# Patient Record
Sex: FEMALE | Race: BLACK OR AFRICAN AMERICAN | NOT HISPANIC OR LATINO | Employment: PART TIME | URBAN - METROPOLITAN AREA
[De-identification: names, ages, dates, MRNs, and addresses within clinical notes are randomized per-mention and may not be internally consistent; named-entity substitution may affect disease eponyms.]

---

## 2020-08-18 ENCOUNTER — HOSPITAL ENCOUNTER (EMERGENCY)
Facility: HOSPITAL | Age: 30
Discharge: HOME | End: 2020-08-18
Attending: EMERGENCY MEDICINE | Admitting: EMERGENCY MEDICINE
Payer: COMMERCIAL

## 2020-08-18 VITALS
HEART RATE: 80 BPM | BODY MASS INDEX: 34.07 KG/M2 | SYSTOLIC BLOOD PRESSURE: 131 MMHG | TEMPERATURE: 96.7 F | RESPIRATION RATE: 16 BRPM | OXYGEN SATURATION: 100 % | HEIGHT: 69 IN | DIASTOLIC BLOOD PRESSURE: 76 MMHG | WEIGHT: 230 LBS

## 2020-08-18 DIAGNOSIS — M54.30 SCIATICA: Primary | ICD-10-CM

## 2020-08-18 PROCEDURE — 99284 EMERGENCY DEPT VISIT MOD MDM: CPT | Performed by: EMERGENCY MEDICINE

## 2020-08-18 PROCEDURE — 99283 EMERGENCY DEPT VISIT LOW MDM: CPT

## 2020-08-18 RX ORDER — ALBUTEROL SULFATE 2.5 MG/3ML
2.5 SOLUTION RESPIRATORY (INHALATION) EVERY 6 HOURS PRN
COMMUNITY
Start: 2020-03-17

## 2020-08-18 RX ORDER — MONTELUKAST SODIUM 10 MG/1
10 TABLET ORAL
COMMUNITY
Start: 2020-04-16

## 2020-08-18 RX ORDER — PREDNISONE 20 MG/1
40 TABLET ORAL DAILY
Qty: 10 TABLET | Refills: 0 | Status: SHIPPED | OUTPATIENT
Start: 2020-08-18 | End: 2020-08-23

## 2020-08-18 NOTE — ED PROVIDER NOTES
History  Chief Complaint   Patient presents with    Leg Pain     Patient reports her right leg hurts from thigh down to ankle from laying on the ground 3 weeks ago  Reports using biofreeze  This is a 62-year-old female who presents emergency department with right buttock pain and radiation down the right leg  This began approximately 3 weeks ago when she slipped on the floor  She states that since that time it has been present  Increasing recently  No relief with Aleve or Biofreeze  No weakness or numbness  No loss of bowel or bladder function  Pain is moderate severity  Worse with movement  Radiates down the back and the lateral aspect of the right leg to the knee  No other associated factors  No other history of trauma  Prior to Admission Medications   Prescriptions Last Dose Informant Patient Reported? Taking? albuterol (2 5 mg/3 mL) 0 083 % nebulizer solution   Yes Yes   Sig: Inhale 2 5 mg every 6 (six) hours as needed    metFORMIN (GLUCOPHAGE) 1000 MG tablet   Yes No   Sig: Take 1,000 mg by mouth daily    montelukast (SINGULAIR) 10 mg tablet   Yes Yes   Sig: Take 10 mg by mouth daily at bedtime       Facility-Administered Medications: None       Past Medical History:   Diagnosis Date    Asthma     PCOS (polycystic ovarian syndrome)        History reviewed  No pertinent surgical history  History reviewed  No pertinent family history  I have reviewed and agree with the history as documented  E-Cigarette/Vaping     E-Cigarette/Vaping Substances     Social History     Tobacco Use    Smoking status: Current Every Day Smoker     Packs/day: 0 25     Types: Cigarettes    Smokeless tobacco: Never Used   Substance Use Topics    Alcohol use: Yes     Frequency: Monthly or less    Drug use: Yes     Frequency: 14 0 times per week     Types: Marijuana       Review of Systems   Constitutional: Negative for activity change, chills and fever     Respiratory: Negative for cough and shortness of breath  Cardiovascular: Negative for chest pain and palpitations  Gastrointestinal: Negative for abdominal pain, diarrhea, nausea and vomiting  Endocrine: Negative for polyuria  Genitourinary: Negative for difficulty urinating, dysuria, frequency and urgency  Musculoskeletal: Positive for back pain  Negative for arthralgias, gait problem, myalgias and neck stiffness  Skin: Negative for color change and rash  Allergic/Immunologic: Negative for immunocompromised state  Neurological: Negative for dizziness, syncope, weakness and numbness  Hematological: Does not bruise/bleed easily  Psychiatric/Behavioral: Negative for confusion  Physical Exam  Physical Exam  Vitals signs and nursing note reviewed  Constitutional:       General: She is not in acute distress  Appearance: She is well-developed  HENT:      Head: Normocephalic  Nose: Nose normal    Eyes:      General: No scleral icterus  Conjunctiva/sclera: Conjunctivae normal    Neck:      Musculoskeletal: Normal range of motion and neck supple  Cardiovascular:      Rate and Rhythm: Normal rate and regular rhythm  Heart sounds: Normal heart sounds  No murmur  Pulmonary:      Effort: Pulmonary effort is normal  No respiratory distress  Breath sounds: Normal breath sounds  No wheezing  Abdominal:      General: There is no distension  Palpations: Abdomen is soft  Tenderness: There is no abdominal tenderness  Musculoskeletal:         General: Tenderness (Tender over the right buttock ) present  No deformity  Comments: 5/5 strength bilateral lower extremities  Normal sensation bilateral lower extremities  Reproducible symptoms with right straight leg raise  No midline spinal tenderness  Skin:     General: Skin is warm and dry  Findings: No erythema or rash  Neurological:      General: No focal deficit present        Mental Status: She is alert and oriented to person, place, and time       Motor: No abnormal muscle tone  Coordination: Coordination normal    Psychiatric:         Thought Content: Thought content normal          Vital Signs  ED Triage Vitals [08/18/20 0701]   Temperature Pulse Respirations Blood Pressure SpO2   (!) 96 7 °F (35 9 °C) 80 16 131/76 100 %      Temp Source Heart Rate Source Patient Position - Orthostatic VS BP Location FiO2 (%)   Tympanic Monitor Lying Right arm --      Pain Score       9           Vitals:    08/18/20 0701   BP: 131/76   Pulse: 80   Patient Position - Orthostatic VS: Lying         Visual Acuity      ED Medications  Medications - No data to display    Diagnostic Studies  Results Reviewed     None                 No orders to display              Procedures  Procedures         ED Course       US AUDIT      Most Recent Value   Initial Alcohol Screen: US AUDIT-C    1  How often do you have a drink containing alcohol? 2 Filed at: 08/18/2020 0714   2  How many drinks containing alcohol do you have on a typical day you are drinking? 0 Filed at: 08/18/2020 0714   3b  FEMALE Any Age, or MALE 65+: How often do you have 4 or more drinks on one occassion? 0 Filed at: 08/18/2020 0714   Audit-C Score  2 Filed at: 08/18/2020 7812                                            [facility]  Number of Diagnoses or Management Options  Diagnosis management comments: Patient with history consistent with sciatica  No history of fevers  No neuro deficit  Will try conservative management follow-up PCP if not better in 1 week  Disposition  Final diagnoses:   Sciatica     Time reflects when diagnosis was documented in both MDM as applicable and the Disposition within this note     Time User Action Codes Description Comment    8/18/2020  7:24 AM Zack Tsang Add [M54 30] Sciatica       ED Disposition     ED Disposition Condition Date/Time Comment    Discharge Stable Tue Aug 18, 2020  7:24 AM Paddy Vuong discharge to home/self care              Follow-up Information     Follow up With Specialties Details Why Contact Info    Your primary care provider  In 1 week If not improved           Patient's Medications   Discharge Prescriptions    PREDNISONE 20 MG TABLET    Take 2 tablets (40 mg total) by mouth daily for 5 days       Start Date: 8/18/2020 End Date: 8/23/2020       Order Dose: 40 mg       Quantity: 10 tablet    Refills: 0     No discharge procedures on file      PDMP Review     None          ED Provider  Electronically Signed by           Marisa Nelson MD  08/18/20 8017

## 2020-09-23 ENCOUNTER — HOSPITAL ENCOUNTER (EMERGENCY)
Facility: HOSPITAL | Age: 30
Discharge: HOME | End: 2020-09-23
Attending: EMERGENCY MEDICINE
Payer: COMMERCIAL

## 2020-09-23 VITALS
DIASTOLIC BLOOD PRESSURE: 102 MMHG | HEART RATE: 91 BPM | OXYGEN SATURATION: 100 % | SYSTOLIC BLOOD PRESSURE: 147 MMHG | BODY MASS INDEX: 34.46 KG/M2 | TEMPERATURE: 98.2 F | RESPIRATION RATE: 18 BRPM | WEIGHT: 230 LBS

## 2020-09-23 DIAGNOSIS — M54.9 BACK PAIN: Primary | ICD-10-CM

## 2020-09-23 LAB
EXT PREG TEST URINE: NEGATIVE
EXT. CONTROL ED NAV: NORMAL

## 2020-09-23 PROCEDURE — 99284 EMERGENCY DEPT VISIT MOD MDM: CPT | Performed by: EMERGENCY MEDICINE

## 2020-09-23 PROCEDURE — 96372 THER/PROPH/DIAG INJ SC/IM: CPT

## 2020-09-23 PROCEDURE — 81025 URINE PREGNANCY TEST: CPT | Performed by: EMERGENCY MEDICINE

## 2020-09-23 PROCEDURE — 99283 EMERGENCY DEPT VISIT LOW MDM: CPT

## 2020-09-23 RX ORDER — PREDNISONE 20 MG/1
40 TABLET ORAL DAILY
Qty: 8 TABLET | Refills: 0 | Status: SHIPPED | OUTPATIENT
Start: 2020-09-23 | End: 2020-09-27

## 2020-09-23 RX ORDER — KETOROLAC TROMETHAMINE 30 MG/ML
15 INJECTION, SOLUTION INTRAMUSCULAR; INTRAVENOUS ONCE
Status: COMPLETED | OUTPATIENT
Start: 2020-09-23 | End: 2020-09-23

## 2020-09-23 RX ORDER — ACETAMINOPHEN 325 MG/1
650 TABLET ORAL EVERY 6 HOURS PRN
Qty: 30 TABLET | Refills: 0 | Status: SHIPPED | OUTPATIENT
Start: 2020-09-23 | End: 2020-09-28

## 2020-09-23 RX ORDER — IBUPROFEN 400 MG/1
400 TABLET ORAL EVERY 6 HOURS PRN
Qty: 30 TABLET | Refills: 0 | Status: SHIPPED | OUTPATIENT
Start: 2020-09-23 | End: 2021-06-01 | Stop reason: ALTCHOICE

## 2020-09-23 RX ORDER — ACETAMINOPHEN 325 MG/1
650 TABLET ORAL ONCE
Status: COMPLETED | OUTPATIENT
Start: 2020-09-23 | End: 2020-09-23

## 2020-09-23 RX ORDER — CYCLOBENZAPRINE HCL 5 MG
5 TABLET ORAL 2 TIMES DAILY PRN
Qty: 10 TABLET | Refills: 0 | Status: SHIPPED | OUTPATIENT
Start: 2020-09-23 | End: 2020-10-05 | Stop reason: ALTCHOICE

## 2020-09-23 RX ADMIN — PREDNISONE 50 MG: 20 TABLET ORAL at 18:22

## 2020-09-23 RX ADMIN — KETOROLAC TROMETHAMINE 15 MG: 30 INJECTION, SOLUTION INTRAMUSCULAR at 18:20

## 2020-09-23 RX ADMIN — ACETAMINOPHEN 650 MG: 325 TABLET, FILM COATED ORAL at 18:22

## 2020-09-23 RX ADMIN — DICLOFENAC SODIUM 2 G: 10 GEL TOPICAL at 18:24

## 2020-09-26 NOTE — ED PROVIDER NOTES
History  Chief Complaint   Patient presents with    Leg Pain     Pt reports she was seen here last month for the same right leg pain, worsening progressively      HPI    Patient is a 74-year-old female who reports to the emergency department with lower back pain  She notes the pain starts in the gluteal area and radiates down the leg  Denies fevers, chills, sweats  No saddle anesthesia  Full strength and sensation bilateral lower extremities  No loss of bowel or bladder function  Denies IV drug use  Denies history of cancer  Denies direct trauma  No unexpected weight loss  No long term steroid use  No pulsatile abdominal mass  No hematuria  No HIV, Transplant or systemic corticosteroids  No midline tenderness  She notes being seen previously and diagnosed with sciatic pain  This is consistent with my exam     Pain is achy, occasionally electrical in nature, shoots down the leg, initially got better after being seen in the emergency department last month but has since worsened over the past several days  Medical decision makin-year-old female, suspect sciatic pain, will treat symptomatically, discharge home, follow-up with orthopedics spine specialist     Prior to Admission Medications   Prescriptions Last Dose Informant Patient Reported? Taking? albuterol (2 5 mg/3 mL) 0 083 % nebulizer solution   Yes No   Sig: Inhale 2 5 mg every 6 (six) hours as needed    metFORMIN (GLUCOPHAGE) 1000 MG tablet   Yes No   Sig: Take 1,000 mg by mouth daily    montelukast (SINGULAIR) 10 mg tablet 2020 at Unknown time  Yes Yes   Sig: Take 10 mg by mouth daily at bedtime       Facility-Administered Medications: None       Past Medical History:   Diagnosis Date    Asthma     PCOS (polycystic ovarian syndrome)        History reviewed  No pertinent surgical history  History reviewed  No pertinent family history  I have reviewed and agree with the history as documented      E-Cigarette/Vaping E-Cigarette/Vaping Substances     Social History     Tobacco Use    Smoking status: Current Every Day Smoker     Packs/day: 0 25     Types: Cigarettes    Smokeless tobacco: Never Used   Substance Use Topics    Alcohol use: Yes     Frequency: Monthly or less    Drug use: Yes     Frequency: 14 0 times per week     Types: Marijuana       Review of Systems   Constitutional: Negative for chills and fever  HENT: Negative for ear discharge and facial swelling  Respiratory: Negative for chest tightness, shortness of breath and wheezing  Cardiovascular: Negative for chest pain and palpitations  Gastrointestinal: Negative for abdominal pain, diarrhea and vomiting  Genitourinary: Negative for dysuria and hematuria  Musculoskeletal: Positive for back pain  Negative for arthralgias and neck stiffness  Skin: Negative for color change and rash  Allergic/Immunologic: Negative  Neurological: Negative for tremors, seizures and syncope  Psychiatric/Behavioral: Negative for hallucinations and suicidal ideas  All other systems reviewed and are negative  Physical Exam  Physical Exam  Vitals signs and nursing note reviewed  Constitutional:       Appearance: She is well-developed  HENT:      Head: Normocephalic and atraumatic  Right Ear: External ear normal       Left Ear: External ear normal    Eyes:      Conjunctiva/sclera: Conjunctivae normal    Neck:      Musculoskeletal: Normal range of motion and neck supple  Vascular: No JVD  Trachea: No tracheal deviation  Cardiovascular:      Rate and Rhythm: Normal rate and regular rhythm  Heart sounds: Normal heart sounds  Pulmonary:      Effort: Pulmonary effort is normal  No respiratory distress  Breath sounds: No wheezing or rales  Abdominal:      General: Bowel sounds are normal       Palpations: Abdomen is soft  Tenderness: There is no abdominal tenderness  There is no guarding or rebound     Musculoskeletal: General: No tenderness  Comments: Tender to left gluteal muscle   Skin:     General: Skin is warm and dry  Findings: No erythema or rash  Neurological:      General: No focal deficit present  Mental Status: She is alert and oriented to person, place, and time  Motor: No weakness  Psychiatric:         Behavior: Behavior normal          Thought Content: Thought content normal          Vital Signs  ED Triage Vitals [09/23/20 1755]   Temperature Pulse Respirations Blood Pressure SpO2   98 2 °F (36 8 °C) 91 18 (!) 147/102 100 %      Temp Source Heart Rate Source Patient Position - Orthostatic VS BP Location FiO2 (%)   Tympanic Monitor Sitting Left arm --      Pain Score       Worst Possible Pain           Vitals:    09/23/20 1755   BP: (!) 147/102   Pulse: 91   Patient Position - Orthostatic VS: Sitting         Visual Acuity      ED Medications  Medications   ketorolac (TORADOL) injection 15 mg (15 mg Intramuscular Given 9/23/20 1820)   acetaminophen (TYLENOL) tablet 650 mg (650 mg Oral Given 9/23/20 1822)   diclofenac sodium (VOLTAREN) 1 % topical gel 2 g (2 g Topical Given 9/23/20 1824)   predniSONE tablet 50 mg (50 mg Oral Given 9/23/20 1822)       Diagnostic Studies  Results Reviewed     Procedure Component Value Units Date/Time    POCT pregnancy, urine [[id number]]  (Normal) Resulted:  09/23/20 1917    Lab Status:  Final result Updated:  09/23/20 1917     EXT PREG TEST UR (Ref: Negative) negative     Control valid                 No orders to display              Procedures  Procedures         ED Course                           SBIRT 20yo+      Most Recent Value   SBIRT (22 yo +)   In order to provide better care to our patients, we are screening all of our patients for alcohol and drug use  Would it be okay to ask you these screening questions?   No Filed at: 09/23/2020 1757                  MDM    Disposition  Final diagnoses:   Back pain     Time reflects when diagnosis was documented in both MDM as applicable and the Disposition within this note     Time User Action Codes Description Comment    9/23/2020  7:20 PM Ajit Bahena, [address] [M54 9] Back pain       ED Disposition     ED Disposition Condition Date/Time Comment    Discharge Stable Wed Sep 23, 2020  7:13 PM Yolie Lara discharge to home/self care              Follow-up Information     Follow up With Specialties Details Why Contact Info    [facility]  Call in 3 days  [facility]  [address] [place] [address]   [phone]          Discharge Medication List as of 9/23/2020  7:20 PM      START taking these medications    Details   acetaminophen (TYLENOL) 325 mg tablet Take 2 tablets (650 mg total) by mouth every 6 (six) hours as needed for mild pain for up to 5 days, Starting Wed 9/23/2020, Until Mon 9/28/2020, Print      cyclobenzaprine (FLEXERIL) 5 mg tablet Take 1 tablet (5 mg total) by mouth 2 (two) times a day as needed for muscle spasms for up to 4 days, Starting Wed 9/23/2020, Until Sun 9/27/2020, Print      diclofenac sodium (VOLTAREN) 1 % Apply 2 g topically 4 (four) times a day for 5 days, Starting Wed 9/23/2020, Until Mon 9/28/2020, Print      ibuprofen (MOTRIN) 400 mg tablet Take 1 tablet (400 mg total) by mouth every 6 (six) hours as needed for mild pain for up to 5 days, Starting Wed 9/23/2020, Until Mon 9/28/2020, Print      predniSONE 20 mg tablet Take 2 tablets (40 mg total) by mouth daily for 4 days, Starting Wed 9/23/2020, Until Sun 9/27/2020, Print         CONTINUE these medications which have NOT CHANGED    Details   montelukast (SINGULAIR) 10 mg tablet Take 10 mg by mouth daily at bedtime , Starting Thu 4/16/2020, Historical Med      albuterol (2 5 mg/3 mL) 0 083 % nebulizer solution Inhale 2 5 mg every 6 (six) hours as needed , Starting Tue 3/17/2020, Historical Med      metFORMIN (GLUCOPHAGE) 1000 MG tablet Take 1,000 mg by mouth daily , Historical Med           No discharge procedures on file      PDMP Review     None          ED Provider  Electronically Signed by           Mona Sutton MD  09/26/20 6281

## 2020-10-02 ENCOUNTER — APPOINTMENT (OUTPATIENT)
Dept: RADIOLOGY | Facility: CLINIC | Age: 30
End: 2020-10-02
Payer: COMMERCIAL

## 2020-10-02 ENCOUNTER — OFFICE VISIT (OUTPATIENT)
Dept: OBGYN CLINIC | Facility: CLINIC | Age: 30
End: 2020-10-02
Payer: COMMERCIAL

## 2020-10-02 VITALS
BODY MASS INDEX: 34.46 KG/M2 | HEART RATE: 88 BPM | HEIGHT: 69 IN | SYSTOLIC BLOOD PRESSURE: 128 MMHG | DIASTOLIC BLOOD PRESSURE: 90 MMHG

## 2020-10-02 DIAGNOSIS — M54.16 ACUTE RIGHT LUMBAR RADICULOPATHY: Primary | ICD-10-CM

## 2020-10-02 DIAGNOSIS — M54.16 LUMBAR RADICULOPATHY: ICD-10-CM

## 2020-10-02 PROCEDURE — 99203 OFFICE O/P NEW LOW 30 MIN: CPT | Performed by: ORTHOPAEDIC SURGERY

## 2020-10-02 PROCEDURE — 72100 X-RAY EXAM L-S SPINE 2/3 VWS: CPT

## 2020-10-02 RX ORDER — NAPROXEN 500 MG/1
500 TABLET ORAL 2 TIMES DAILY WITH MEALS
Qty: 60 TABLET | Refills: 0 | Status: SHIPPED | OUTPATIENT
Start: 2020-10-02 | End: 2020-10-26

## 2020-10-05 DIAGNOSIS — M54.9 BACK PAIN: ICD-10-CM

## 2020-10-05 RX ORDER — CYCLOBENZAPRINE HCL 10 MG
10 TABLET ORAL 3 TIMES DAILY PRN
Qty: 20 TABLET | Refills: 0 | Status: SHIPPED | OUTPATIENT
Start: 2020-10-05 | End: 2020-11-16 | Stop reason: SDUPTHER

## 2020-10-24 DIAGNOSIS — M54.16 ACUTE RIGHT LUMBAR RADICULOPATHY: ICD-10-CM

## 2020-10-26 RX ORDER — NAPROXEN 500 MG/1
TABLET ORAL
Qty: 60 TABLET | Refills: 0 | Status: SHIPPED | OUTPATIENT
Start: 2020-10-26 | End: 2020-12-17 | Stop reason: ALTCHOICE

## 2020-11-16 ENCOUNTER — TELEPHONE (OUTPATIENT)
Dept: OBGYN CLINIC | Facility: HOSPITAL | Age: 30
End: 2020-11-16

## 2020-11-16 DIAGNOSIS — M54.9 BACK PAIN: ICD-10-CM

## 2020-11-16 RX ORDER — CYCLOBENZAPRINE HCL 10 MG
10 TABLET ORAL 3 TIMES DAILY PRN
Qty: 30 TABLET | Refills: 0 | Status: SHIPPED | OUTPATIENT
Start: 2020-11-16 | End: 2020-12-15 | Stop reason: SDUPTHER

## 2020-12-15 ENCOUNTER — TELEPHONE (OUTPATIENT)
Dept: OBGYN CLINIC | Facility: HOSPITAL | Age: 30
End: 2020-12-15

## 2020-12-15 DIAGNOSIS — M54.9 BACK PAIN: ICD-10-CM

## 2020-12-15 RX ORDER — CYCLOBENZAPRINE HCL 10 MG
10 TABLET ORAL 3 TIMES DAILY PRN
Qty: 30 TABLET | Refills: 0 | Status: SHIPPED | OUTPATIENT
Start: 2020-12-15 | End: 2021-01-15 | Stop reason: SDUPTHER

## 2020-12-17 ENCOUNTER — TELEMEDICINE (OUTPATIENT)
Dept: OBGYN CLINIC | Facility: CLINIC | Age: 30
End: 2020-12-17
Payer: COMMERCIAL

## 2020-12-17 DIAGNOSIS — M54.16 ACUTE RIGHT LUMBAR RADICULOPATHY: Primary | ICD-10-CM

## 2020-12-17 PROCEDURE — 99214 OFFICE O/P EST MOD 30 MIN: CPT | Performed by: ORTHOPAEDIC SURGERY

## 2020-12-17 RX ORDER — DICLOFENAC SODIUM 75 MG/1
75 TABLET, DELAYED RELEASE ORAL 2 TIMES DAILY
Qty: 60 TABLET | Refills: 0 | Status: SHIPPED | OUTPATIENT
Start: 2020-12-17 | End: 2021-01-15 | Stop reason: ALTCHOICE

## 2021-01-04 ENCOUNTER — TELEPHONE (OUTPATIENT)
Dept: OBGYN CLINIC | Facility: HOSPITAL | Age: 31
End: 2021-01-04

## 2021-01-04 DIAGNOSIS — M54.16 ACUTE RIGHT LUMBAR RADICULOPATHY: Primary | ICD-10-CM

## 2021-01-04 NOTE — TELEPHONE ENCOUNTER
Patient is calling in reference to authorization for an MRI of her back  Please call the patient to advise if authorization has been received or not  Order should have been placed on 12/17/20      Please advise

## 2021-01-06 NOTE — TELEPHONE ENCOUNTER
I have contacted [facility] - spoke with Cay Eisenmenger  Updated site to facility listed  Contacted Yolie to let her know

## 2021-01-06 NOTE — TELEPHONE ENCOUNTER
Patient states Auth is needed for MRI ordered by Dr Safia Stewart [address] [facility]  [facility] [address] [facility] [address] [facility] [id number]     Phone#  [phone]     Fax#  [phone]

## 2021-01-09 ENCOUNTER — TELEPHONE (OUTPATIENT)
Dept: OBGYN CLINIC | Facility: HOSPITAL | Age: 31
End: 2021-01-09

## 2021-01-09 NOTE — TELEPHONE ENCOUNTER
Patient sees Dr Jimenez Longoria   Patient is calling in wanting to get the MRI script faxed over to [facility] at [phone] please fax patient is currently at her appointment now

## 2021-01-15 ENCOUNTER — OFFICE VISIT (OUTPATIENT)
Dept: OBGYN CLINIC | Facility: CLINIC | Age: 31
End: 2021-01-15
Payer: COMMERCIAL

## 2021-01-15 VITALS
HEART RATE: 100 BPM | WEIGHT: 244 LBS | HEIGHT: 69 IN | DIASTOLIC BLOOD PRESSURE: 90 MMHG | SYSTOLIC BLOOD PRESSURE: 141 MMHG | BODY MASS INDEX: 36.14 KG/M2

## 2021-01-15 DIAGNOSIS — M54.9 BACK PAIN: ICD-10-CM

## 2021-01-15 DIAGNOSIS — M51.16 LUMBAR DISC HERNIATION WITH RADICULOPATHY: Primary | ICD-10-CM

## 2021-01-15 PROCEDURE — 99213 OFFICE O/P EST LOW 20 MIN: CPT | Performed by: ORTHOPAEDIC SURGERY

## 2021-01-15 RX ORDER — MELOXICAM 15 MG/1
15 TABLET ORAL DAILY
Qty: 30 TABLET | Refills: 0 | Status: SHIPPED | OUTPATIENT
Start: 2021-01-15 | End: 2021-06-01

## 2021-01-15 RX ORDER — CYCLOBENZAPRINE HCL 10 MG
10 TABLET ORAL 3 TIMES DAILY PRN
Qty: 30 TABLET | Refills: 0 | Status: SHIPPED | OUTPATIENT
Start: 2021-01-15 | End: 2021-02-08

## 2021-01-15 RX ORDER — CYCLOBENZAPRINE HCL 10 MG
10 TABLET ORAL 3 TIMES DAILY PRN
Qty: 30 TABLET | Refills: 0 | Status: CANCELLED | OUTPATIENT
Start: 2021-01-15

## 2021-01-15 NOTE — PROGRESS NOTES
Assessment/Plan:  1  Lumbar disc herniation with radiculopathy  Ambulatory referral to Pain Management    meloxicam (MOBIC) 15 mg tablet       Yolie has low back pain and a lumbar disc herniation on her MRI report  She did not bring the disc for review today  I referred her to Dr Igor Mccauley pain management to discuss potential injection at this level  She was advised to bring the MRI report at that time  Subjective:   Tosin Brewster is a 31 y o  female who presents who presents to the office for follow-up evaluation on her lumbar spine  She has had a long history of low back pain and lumbar radiculopathy down the right leg  She failed conservative treatment of physical therapy  We sent her for an MRI of the lumbar spine she returns for results today  She has had no significant relief with anti-inflammatories, therapy, prednisone or muscle relaxers  She denies significant weakness or numbness in her lower extremity  Review of Systems   Constitutional: Negative for chills, fever and unexpected weight change  HENT: Negative for hearing loss, nosebleeds and sore throat  Eyes: Negative for pain, redness and visual disturbance  Respiratory: Negative for cough, shortness of breath and wheezing  Cardiovascular: Negative for chest pain, palpitations and leg swelling  Gastrointestinal: Negative for abdominal pain, nausea and vomiting  Endocrine: Negative for polydipsia and polyuria  Genitourinary: Negative for dysuria and hematuria  Musculoskeletal:        See HPI   Skin: Negative for rash and wound  Neurological: Negative for dizziness, numbness and headaches  Psychiatric/Behavioral: Negative for decreased concentration and suicidal ideas  The patient is not nervous/anxious  Past Medical History:   Diagnosis Date    Asthma     PCOS (polycystic ovarian syndrome)        History reviewed  No pertinent surgical history  History reviewed  No pertinent family history      Social History     Occupational History    Not on file   Tobacco Use    Smoking status: Current Every Day Smoker     Packs/day: 0 25     Types: Cigarettes    Smokeless tobacco: Never Used   Substance and Sexual Activity    Alcohol use: Yes     Frequency: Monthly or less    Drug use: Yes     Frequency: 14 0 times per week     Types: Marijuana    Sexual activity: Not on file         Current Outpatient Medications:     albuterol (2 5 mg/3 mL) 0 083 % nebulizer solution, Inhale 2 5 mg every 6 (six) hours as needed , Disp: , Rfl:     cyclobenzaprine (FLEXERIL) 10 mg tablet, Take 1 tablet (10 mg total) by mouth 3 (three) times a day as needed for muscle spasms, Disp: 30 tablet, Rfl: 0    metFORMIN (GLUCOPHAGE) 1000 MG tablet, Take 1,000 mg by mouth daily , Disp: , Rfl:     montelukast (SINGULAIR) 10 mg tablet, Take 10 mg by mouth daily at bedtime , Disp: , Rfl:     ibuprofen (MOTRIN) 400 mg tablet, Take 1 tablet (400 mg total) by mouth every 6 (six) hours as needed for mild pain for up to 5 days, Disp: 30 tablet, Rfl: 0    meloxicam (MOBIC) 15 mg tablet, Take 1 tablet (15 mg total) by mouth daily, Disp: 30 tablet, Rfl: 0    Allergies   Allergen Reactions    Avocado Hives       Objective:  Vitals:    01/15/21 0831   BP: 141/90   Pulse: 100       Back Exam     Tenderness   The patient is experiencing tenderness in the lumbar  Muscle Strength   Right Quadriceps:  5/5   Left Quadriceps:  5/5   Right Hamstrings:  5/5   Left Hamstrings:  5/5     Tests   Straight leg raise right: positive at 90 deg  Straight leg raise left: negative    Other   Sensation: normal  Gait: normal             Physical Exam  Vitals signs reviewed  Constitutional:       Appearance: She is well-developed  HENT:      Head: Normocephalic and atraumatic  Eyes:      Conjunctiva/sclera: Conjunctivae normal       Pupils: Pupils are equal, round, and reactive to light  Neck:      Musculoskeletal: Normal range of motion and neck supple  Cardiovascular:      Rate and Rhythm: Normal rate  Pulmonary:      Effort: Pulmonary effort is normal  No respiratory distress  Skin:     General: Skin is warm and dry  Neurological:      Mental Status: She is alert and oriented to person, place, and time     Psychiatric:         Behavior: Behavior normal

## 2021-01-15 NOTE — LETTER
January 15, 2021     Patient: Shaheen Hendrix   YOB: 1990   Date of Visit: 1/15/2021       To Whom it May Concern:    Rita Guido is under my professional care  She was seen in my office on 1/15/2021  She may return to work on 1/18/2021  If you have any questions or concerns, please don't hesitate to call  Sincerely,          Zulema Damian DO        CC: Yoile Royalr

## 2021-02-03 ENCOUNTER — TELEPHONE (OUTPATIENT)
Dept: OBGYN CLINIC | Facility: HOSPITAL | Age: 31
End: 2021-02-03

## 2021-02-03 NOTE — TELEPHONE ENCOUNTER
Patient calling back stating that she was disconnected when speaking to the nurse  Patient would not give her name or  & said to just transfer her over because she didn't have time for this shit  I tried to transfer her shortly after but she hung up

## 2021-02-03 NOTE — TELEPHONE ENCOUNTER
Patient is calling back stating "oh you again"  I did let her know that our nurse does have other patients & she stated "Baby, I know but if you would have just transferred me the first time I wouldn't be calling back again   just put her on the damn phone "

## 2021-02-03 NOTE — TELEPHONE ENCOUNTER
Yolie would like a referral to a provider who is closer    Please refer to [address] [facility], [place] , [address]    [address]    Please fax to [phone], Attention Mary Portillo

## 2021-02-03 NOTE — TELEPHONE ENCOUNTER
Patient is calling because the motrin she was given is not working  Patient is stating that her nephew got a bottle of percocet for a tooth & she cannot get anything for her pain  Patient is also mad that she cannot get in with "the other mk" for another month but it is actually on 2/18/21  Patient would like a call back explaining what she can do for her pain or is asking for a pain med             [phone]

## 2021-02-03 NOTE — TELEPHONE ENCOUNTER
Spoke to patient  She stated she is taking "way too much tylenol and ibuprofen " Stated she is taking 600 mg ibuprofen Q6h and 4612-6887 mg tylenol as well Q6h and nothing is helping her pain  Stated she is not taking mobic any longer  She stated she has been using ice and heat nothing is helping  Stated she needs something for pain or a referral to a pain management doctor  Nurse advised patient that she is referred to a pain management doctor for her appointment on 2/18  Advised that she should maintain limited recommended on the bottle for OTC medications as to avoid organ damage and not take mobic in addition to the ibuprofen  Patient asked what she is supposed to do again  Stated her Cousin got percocet for a toothache and she cant get anything to help her pain from our office  Advised that we do not prescribe narcotic medications  Advised her that [facility] does not prescribe pain medications on the first visit either  She stated our pain management doctor is too far for her to see and in the middle of nurse advising that we can send pain management referral wherever she wishes she lost connection on the phone  LM on  for call back  Patient called back  Advised her that we can send the referral wherever she wishes and patient stated she wishes to end her care with our office because we are not helping her   She states, "I don't have time for this "

## 2021-02-04 DIAGNOSIS — M51.16 LUMBAR DISC HERNIATION WITH RADICULOPATHY: Primary | ICD-10-CM

## 2021-02-08 DIAGNOSIS — M54.9 BACK PAIN: ICD-10-CM

## 2021-02-08 RX ORDER — CYCLOBENZAPRINE HCL 10 MG
TABLET ORAL
Qty: 30 TABLET | Refills: 0 | Status: SHIPPED | OUTPATIENT
Start: 2021-02-08

## 2021-02-17 ENCOUNTER — TELEPHONE (OUTPATIENT)
Dept: PAIN MEDICINE | Facility: CLINIC | Age: 31
End: 2021-02-17

## 2021-02-17 NOTE — TELEPHONE ENCOUNTER
Pt has an appt tomorrow at 8:30am  Pt says she lives over 1 hr away  Pt asks if she will be rescheduled tomorrow, please give her plenty of notice ahead of time  Thanks   Ph# [phone]

## 2021-03-30 DIAGNOSIS — Z23 ENCOUNTER FOR IMMUNIZATION: ICD-10-CM

## 2021-06-01 DIAGNOSIS — M51.16 LUMBAR DISC HERNIATION WITH RADICULOPATHY: ICD-10-CM

## 2021-06-01 RX ORDER — MELOXICAM 15 MG/1
TABLET ORAL
Qty: 30 TABLET | Refills: 0 | Status: SHIPPED | OUTPATIENT
Start: 2021-06-01

## 2021-08-13 ENCOUNTER — TELEPHONE (OUTPATIENT)
Dept: OBGYN CLINIC | Facility: OTHER | Age: 31
End: 2021-08-13

## 2021-08-13 NOTE — TELEPHONE ENCOUNTER
Patient called back to confirm fax  She said the fax number is correct  Would you mid faxing again  She was transferred to Medical Records and provided the same number and the faxes from their office went right      Fax # [phone]    C/b # [phone]

## 2021-08-13 NOTE — TELEPHONE ENCOUNTER
Patient called in requesting we fax Pain Medicine referral      Fax # [phone]  Attn : Khang BELTRAN/esperanza # [phone]

## 2021-08-13 NOTE — TELEPHONE ENCOUNTER
I called Yolie at [phone] to advise the fax number given keeps failing, looking for an alternate fax number  LMOM to call back with new fax number

## 2021-08-13 NOTE — TELEPHONE ENCOUNTER
Patient called back in regards pain management referral  She is asking, if someone can please sent the referral today? Fax # [phone]    Thank you

## 2021-08-13 NOTE — TELEPHONE ENCOUNTER
Patient called stating the fax she provided is the correct #  Patient is requesting a call back from [place]      Call back # [phone]

## 2021-08-16 NOTE — TELEPHONE ENCOUNTER
I called and advised a couple days ago to tell the patient the faxes kept failing  I attempted to fax again this morning and it says "no response" I advised the patient Im not sure what else to do unless she can get us an alternate fax number

## 2021-09-09 ENCOUNTER — TELEPHONE (OUTPATIENT)
Dept: OBGYN CLINIC | Facility: HOSPITAL | Age: 31
End: 2021-09-09

## 2021-09-09 NOTE — TELEPHONE ENCOUNTER
Patient called to schedule an appointment to see a surgeon for a microdiscectomy    Patient is insured by [employer] and was advised that our only spine surgeon works out of our [employer] and we do not accept [employer] in [place]